# Patient Record
Sex: MALE | Race: WHITE | NOT HISPANIC OR LATINO | URBAN - METROPOLITAN AREA
[De-identification: names, ages, dates, MRNs, and addresses within clinical notes are randomized per-mention and may not be internally consistent; named-entity substitution may affect disease eponyms.]

---

## 2021-01-06 ENCOUNTER — IMPORTED ENCOUNTER (OUTPATIENT)
Dept: URBAN - METROPOLITAN AREA CLINIC 50 | Facility: CLINIC | Age: 66
End: 2021-01-06

## 2021-02-03 ENCOUNTER — IMPORTED ENCOUNTER (OUTPATIENT)
Dept: URBAN - METROPOLITAN AREA CLINIC 50 | Facility: CLINIC | Age: 66
End: 2021-02-03

## 2021-04-17 ASSESSMENT — VISUAL ACUITY
OS_SC: 20/20
OD_CC: J1
OS_CC: J1
OD_SC: CF @ 4'

## 2021-04-17 ASSESSMENT — TONOMETRY
OD_IOP_MMHG: 10
OS_IOP_MMHG: 14

## 2022-05-03 ENCOUNTER — PREPPED CHART (OUTPATIENT)
Dept: URBAN - METROPOLITAN AREA CLINIC 49 | Facility: CLINIC | Age: 67
End: 2022-05-03

## 2022-05-23 ENCOUNTER — CONSULTATION/EVALUATION (OUTPATIENT)
Dept: URBAN - METROPOLITAN AREA CLINIC 49 | Facility: CLINIC | Age: 67
End: 2022-05-23

## 2022-05-23 DIAGNOSIS — H02.423: ICD-10-CM

## 2022-05-23 PROCEDURE — 92285 EXTERNAL OCULAR PHOTOGRAPHY: CPT

## 2022-05-23 PROCEDURE — 92012 INTRM OPH EXAM EST PATIENT: CPT

## 2022-05-23 ASSESSMENT — VISUAL ACUITY
OS_PH: 20/50-2
OS_SC: 20/60-1

## 2022-05-23 ASSESSMENT — TONOMETRY
OS_IOP_MMHG: 21
OD_IOP_MMHG: 20

## 2022-06-21 ENCOUNTER — DIAGNOSTICS ONLY (OUTPATIENT)
Dept: URBAN - METROPOLITAN AREA CLINIC 49 | Facility: CLINIC | Age: 67
End: 2022-06-21

## 2022-06-21 DIAGNOSIS — H02.423: ICD-10-CM

## 2022-06-21 PROCEDURE — 92082 INTERMEDIATE VISUAL FIELD XM: CPT

## 2022-06-21 PROCEDURE — 92285 EXTERNAL OCULAR PHOTOGRAPHY: CPT

## 2022-08-31 ENCOUNTER — PRE-OP/H&P (OUTPATIENT)
Dept: URBAN - METROPOLITAN AREA CLINIC 49 | Facility: CLINIC | Age: 67
End: 2022-08-31

## 2022-08-31 DIAGNOSIS — H02.423: ICD-10-CM

## 2022-08-31 PROCEDURE — PREOP PRE OP VISIT

## 2022-08-31 ASSESSMENT — VISUAL ACUITY
OS_SC: 20/40-1
OD_SC: CF 3FT

## 2022-09-06 ENCOUNTER — SURGERY/PROCEDURE (OUTPATIENT)
Dept: URBAN - METROPOLITAN AREA SURGERY 16 | Facility: SURGERY | Age: 67
End: 2022-09-06

## 2022-09-06 DIAGNOSIS — H02.423: ICD-10-CM

## 2022-09-06 PROCEDURE — 6790450 RPR BLPOS LEVATOR RESCJ/ADVMNT XTRNL, BILATERAL

## 2022-09-14 ENCOUNTER — POST-OP (OUTPATIENT)
Dept: URBAN - METROPOLITAN AREA CLINIC 49 | Facility: CLINIC | Age: 67
End: 2022-09-14

## 2022-09-14 DIAGNOSIS — Z98.890: ICD-10-CM

## 2022-09-14 PROCEDURE — 99024 POSTOP FOLLOW-UP VISIT: CPT

## 2022-09-14 ASSESSMENT — VISUAL ACUITY
OU_SC: 20/30-1
OS_SC: 20/30-1
OS_PH: 20/25
OD_SC: CF 3FT

## 2022-09-14 ASSESSMENT — TONOMETRY
OD_IOP_MMHG: 11
OS_IOP_MMHG: 20
OD_IOP_MMHG: 14
OS_IOP_MMHG: 19

## 2022-09-14 NOTE — PATIENT DISCUSSION
1 Week PO: Patient healing well. Removed sutures in office today. Advised patient to continue using Erythromycin Ointment for 3 more nights, then discontinue. Patient can return to regular activities.

## 2022-09-14 NOTE — PATIENT DISCUSSION
Gave patient a sample of Systane complete PF to use when eyes feel dry while reading, eyes will feel more dry since suregery.

## 2022-10-03 ENCOUNTER — POST-OP (OUTPATIENT)
Dept: URBAN - METROPOLITAN AREA CLINIC 49 | Facility: CLINIC | Age: 67
End: 2022-10-03

## 2022-10-03 DIAGNOSIS — Z98.890: ICD-10-CM

## 2022-10-03 PROCEDURE — 92285 EXTERNAL OCULAR PHOTOGRAPHY: CPT

## 2022-10-03 PROCEDURE — 99024 POSTOP FOLLOW-UP VISIT: CPT

## 2022-10-03 ASSESSMENT — TONOMETRY
OS_IOP_MMHG: 18
OD_IOP_MMHG: 08

## 2022-10-03 ASSESSMENT — VISUAL ACUITY
OD_SC: CF 1FT
OS_SC: 20/40

## 2022-10-03 NOTE — PATIENT DISCUSSION
Gave patient a sample of Systane complete PF to use when eyes feel dry while reading, eyes will feel more dry since surgery.

## 2022-10-03 NOTE — PATIENT DISCUSSION
Advised patient that the sub conj hem OU could be linked to the start on the new blood thinner, he started the day following 1 week PO. negative Affect and characteristics of appearance, verbalizations, behaviors are appropriate Yes